# Patient Record
Sex: FEMALE | Race: WHITE | NOT HISPANIC OR LATINO | Employment: UNEMPLOYED | URBAN - METROPOLITAN AREA
[De-identification: names, ages, dates, MRNs, and addresses within clinical notes are randomized per-mention and may not be internally consistent; named-entity substitution may affect disease eponyms.]

---

## 2023-08-22 ENCOUNTER — TRANSCRIBE ORDERS (OUTPATIENT)
Dept: OBSTETRICS AND GYNECOLOGY | Facility: HOSPITAL | Age: 23
End: 2023-08-22
Payer: COMMERCIAL

## 2023-08-22 DIAGNOSIS — O28.3 ABNORMAL FETAL ULTRASOUND: ICD-10-CM

## 2023-08-22 DIAGNOSIS — O35.BXX0 ANOMALY OF HEART OF FETUS AFFECTING PREGNANCY, ANTEPARTUM, SINGLE OR UNSPECIFIED FETUS: ICD-10-CM

## 2023-08-22 DIAGNOSIS — Z36.3 ANTENATAL SCREENING FOR MALFORMATION USING ULTRASONICS: Primary | ICD-10-CM

## 2023-08-22 DIAGNOSIS — Z34.90 PREGNANCY, UNSPECIFIED GESTATIONAL AGE: ICD-10-CM

## 2023-09-13 ENCOUNTER — HOSPITAL ENCOUNTER (OUTPATIENT)
Dept: WOMENS IMAGING | Facility: HOSPITAL | Age: 23
Discharge: HOME | End: 2023-09-13
Admitting: OBSTETRICS & GYNECOLOGY
Payer: COMMERCIAL

## 2023-09-13 ENCOUNTER — OFFICE VISIT (OUTPATIENT)
Dept: OBSTETRICS AND GYNECOLOGY | Facility: HOSPITAL | Age: 23
End: 2023-09-13
Payer: COMMERCIAL

## 2023-09-13 VITALS
SYSTOLIC BLOOD PRESSURE: 109 MMHG | WEIGHT: 200 LBS | HEIGHT: 60 IN | BODY MASS INDEX: 39.27 KG/M2 | DIASTOLIC BLOOD PRESSURE: 69 MMHG

## 2023-09-13 DIAGNOSIS — Z36.3 ANTENATAL SCREENING FOR MALFORMATION USING ULTRASONICS: ICD-10-CM

## 2023-09-13 DIAGNOSIS — O35.BXX0 CONGENITAL HEART DISEASE OF FETUS AFFECTING ANTEPARTUM CARE OF MOTHER, SINGLE OR UNSPECIFIED FETUS: Primary | ICD-10-CM

## 2023-09-13 DIAGNOSIS — O28.3 ABNORMAL FETAL ULTRASOUND: ICD-10-CM

## 2023-09-13 DIAGNOSIS — O35.BXX0 ANOMALY OF HEART OF FETUS AFFECTING PREGNANCY, ANTEPARTUM, SINGLE OR UNSPECIFIED FETUS: ICD-10-CM

## 2023-09-13 DIAGNOSIS — Z34.90 PREGNANCY, UNSPECIFIED GESTATIONAL AGE: ICD-10-CM

## 2023-09-13 PROCEDURE — 76811 OB US DETAILED SNGL FETUS: CPT

## 2023-09-13 RX ORDER — PRENATAL VIT/IRON FUM/FOLIC AC 27MG-0.8MG
TABLET ORAL DAILY
COMMUNITY

## 2023-09-13 NOTE — PROGRESS NOTES
"Documentation of the ultrasound findings, images, and interpretations will be available in the patient's Viewpoint report which is located in the imaging tab in chart review.    Maternal/Fetal Medicine Consult Note     Name: Gwendolyn Rocha    : 2000     MRN: [id number]     Referring Provider: Kimberly Rothman DO    Chief Complaint  concern for dilated aortic root    Subjective     History of Present Illness:  Gwendolyn Rocha is a 23 y.o.  23w1d who presents today for suspected fetal CHD    ALMA ROSA: Estimated Date of Delivery: 24     ROS:   As noted in HPI.     History reviewed. No pertinent past medical history.   Past Surgical History:   Procedure Laterality Date    COLONOSCOPY      ESOPHAGOSCOPY / EGD      LAPAROSCOPIC CHOLECYSTECTOMY      WISDOM TOOTH EXTRACTION        OB History          2    Para   1    Term   1       0    AB   0    Living   1         SAB   0    IAB   0    Ectopic   0    Molar   0    Multiple   0    Live Births   1          Obstetric Comments   FOB #1 Pregnancy #1  at 37 6/7 weeks, female,  difficulty with epidural D/T scoliosis  FOB #1 Pregnancy #2  current               Objective     Vital Signs  /69   Ht 152.4 cm (60\")   Wt 90.7 kg (200 lb)   LMP 2023   Estimated body mass index is 39.06 kg/m² as calculated from the following:    Height as of this encounter: 152.4 cm (60\").    Weight as of this encounter: 90.7 kg (200 lb).    Physical Exam    Ultrasound Impression:   See viewpoint    Assessment and Plan     Gwendolyn Rocha is a 23 y.o.  23w1d who presents today for suspected fetal CHD    Diagnoses and all orders for this visit:    1. Congenital heart disease of fetus affecting antepartum care of mother, single or unspecified fetus (Primary)  Assessment & Plan:  Patient referred for suspected fetal congenital heart defect.  On ultrasound today we agree that the aortic root appears dilated in some views.  In the left " ventricular outflow view the aorta appears slightly dilated at the root.  And the three-vessel view of the aorta appears slightly enlarged however by the time he reached the level of the three-vessel trachea view the aorta appears normal size.  Cardiac weeks exam reveals normal-sized ventricles and no other abnormalities seen while I might suspect a small degree of aortic stenosis with a poststenotic dilatation no stenosis was visible on exam today and no acceleration of flow was demonstrated on color flow across the aortic valve.  This may represent a normal variant or may be a mild abnormality.  I have arranged for another echocardiogram with pediatric cardiology.  We will rescan the fetus for growth and to look for any other abnormalities when she returns for her echocardiogram with pediatric cardiology.    Orders:  -     [facility]; Future    2. Pregnancy, unspecified gestational age  -     [facility]; Future         Follow Up  Return in about 4 weeks (around 10/11/2023).    I spent 30 minutes caring for the patient on the day of service. This included: obtaining or reviewing a separately obtained medical history, reviewing patient records, performing a medically appropriate exam and/or evaluation, counseling or educating the patient/family/caregiver, ordering medications, labs, and/or procedures and documenting such in the medical record. This does not include time spent on review and interpretation of other tests such as fetal ultrasound or the performance of other procedures such as amniocentesis or CVS.      Douglas A. Milligan, MD  Maternal Fetal Medicine, [facility]    Diagnostic Center     2023

## 2023-09-13 NOTE — ASSESSMENT & PLAN NOTE
Patient referred for suspected fetal congenital heart defect.  On ultrasound today we agree that the aortic root appears dilated in some views.  In the left ventricular outflow view the aorta appears slightly dilated at the root.  And the three-vessel view of the aorta appears slightly enlarged however by the time he reached the level of the three-vessel trachea view the aorta appears normal size.  Cardiac weeks exam reveals normal-sized ventricles and no other abnormalities seen while I might suspect a small degree of aortic stenosis with a poststenotic dilatation no stenosis was visible on exam today and no acceleration of flow was demonstrated on color flow across the aortic valve.  This may represent a normal variant or may be a mild abnormality.  I have arranged for another echocardiogram with pediatric cardiology.  We will rescan the fetus for growth and to look for any other abnormalities when she returns for her echocardiogram with pediatric cardiology.

## 2023-09-13 NOTE — PROGRESS NOTES
Patient denies bleeding, leaking fluid or cramping  AFP negative  Patient does complain of syncopal episodes occurring as oftern as 3-4 times per day.  Patient states she becomes lightheaded and pale.  Patient states she has discussed with her OB and OB has run lab work.  Patient next follow up with Dr. JAYA Rothman is 9/15/2023

## 2023-09-13 NOTE — LETTER
"2023     Kimberly Rothman DO  [address]  [place] [address]    Patient: Gwendolyn Rocha   YOB: 2000   Date of Visit: 2023     Dear Kimberly Rothman DO:       Thank you for referring Gwendolyn Rocha to me for evaluation. Below are the relevant portions of my assessment and plan of care.    If you have questions, please do not hesitate to call me. I look forward to following Gwendolyn along with you.         Sincerely,        Douglas A. Milligan, MD        CC: No Recipients    Milligan, Douglas A, MD  23 1354  Incomplete  Documentation of the ultrasound findings, images, and interpretations will be available in the patient's Viewpoint report which is located in the imaging tab in chart review.    Maternal/Fetal Medicine Consult Note     Name: Gwendolyn Rocha    : 2000     MRN: [id number]     Referring Provider: Kimberly Rothman DO    Chief Complaint  concern for dilated aortic root    Subjective     History of Present Illness:  Gwendolyn Rocha is a 23 y.o.  23w1d who presents today for suspected fetal CHD    ALMA ROSA: Estimated Date of Delivery: 24     ROS:   As noted in HPI.     History reviewed. No pertinent past medical history.   Past Surgical History:   Procedure Laterality Date   • COLONOSCOPY     • ESOPHAGOSCOPY / EGD     • LAPAROSCOPIC CHOLECYSTECTOMY     • WISDOM TOOTH EXTRACTION        OB History          2    Para   1    Term   1       0    AB   0    Living   1         SAB   0    IAB   0    Ectopic   0    Molar   0    Multiple   0    Live Births   1          Obstetric Comments   FOB #1 Pregnancy #1  at 37 6/7 weeks, female,  difficulty with epidural D/T scoliosis  FOB #1 Pregnancy #2  current               Objective     Vital Signs  /69   Ht 152.4 cm (60\")   Wt 90.7 kg (200 lb)   LMP 2023   Estimated body mass index is 39.06 kg/m² as calculated from the following:    Height as of this encounter: " "152.4 cm (60\").    Weight as of this encounter: 90.7 kg (200 lb).    Physical Exam    Ultrasound Impression:   See viewpoint    Assessment and Plan     Gwendolyn Rocha is a 23 y.o.  23w1d who presents today for suspected fetal CHD    Diagnoses and all orders for this visit:    1. Congenital heart disease of fetus affecting antepartum care of mother, single or unspecified fetus (Primary)  Assessment & Plan:  Patient referred for suspected fetal congenital heart defect.  On ultrasound today we agree that the aortic root appears dilated in some views.  In the left ventricular outflow view the aorta appears slightly dilated at the root.  And the three-vessel view of the aorta appears slightly enlarged however by the time he reached the level of the three-vessel trachea view the aorta appears normal size.  Cardiac weeks exam reveals normal-sized ventricles and no other abnormalities seen while I might suspect a small degree of aortic stenosis with a poststenotic dilatation no stenosis was visible on exam today and no acceleration of flow was demonstrated on color flow across the aortic valve.  This may represent a normal variant or may be a mild abnormality.  I have arranged for another echocardiogram with pediatric cardiology.  We will rescan the fetus for growth and to look for any other abnormalities when she returns for her echocardiogram with pediatric cardiology.    Orders:  -     [facility]  Diagnostic Center; Future    2. Pregnancy, unspecified gestational age  -     [facility]  Diagnostic Center; Future         Follow Up  No follow-ups on file.    I spent ____ minutes caring for the patient on the day of service. This included: obtaining or reviewing a separately obtained medical history, reviewing patient records, performing a medically appropriate exam and/or evaluation, counseling or educating the patient/family/caregiver, ordering medications, labs, and/or procedures and documenting such in " the medical record. This does not include time spent on review and interpretation of other tests such as fetal ultrasound or the performance of other procedures such as amniocentesis or CVS.      Douglas A. Milligan, MD  Maternal Fetal Medicine, [facility] [place]     2023

## 2023-10-11 ENCOUNTER — HOSPITAL ENCOUNTER (OUTPATIENT)
Dept: WOMENS IMAGING | Facility: HOSPITAL | Age: 23
Discharge: HOME | End: 2023-10-11
Admitting: OBSTETRICS & GYNECOLOGY
Payer: COMMERCIAL

## 2023-10-11 ENCOUNTER — OFFICE VISIT (OUTPATIENT)
Dept: OBSTETRICS AND GYNECOLOGY | Facility: HOSPITAL | Age: 23
End: 2023-10-11
Payer: COMMERCIAL

## 2023-10-11 VITALS — SYSTOLIC BLOOD PRESSURE: 106 MMHG | DIASTOLIC BLOOD PRESSURE: 66 MMHG | BODY MASS INDEX: 39.33 KG/M2 | WEIGHT: 201.4 LBS

## 2023-10-11 DIAGNOSIS — O35.BXX0 CONGENITAL HEART DISEASE OF FETUS AFFECTING ANTEPARTUM CARE OF MOTHER, SINGLE OR UNSPECIFIED FETUS: Primary | ICD-10-CM

## 2023-10-11 DIAGNOSIS — O35.BXX0 CONGENITAL HEART DISEASE OF FETUS AFFECTING ANTEPARTUM CARE OF MOTHER, SINGLE OR UNSPECIFIED FETUS: ICD-10-CM

## 2023-10-11 DIAGNOSIS — Z34.90 PREGNANCY, UNSPECIFIED GESTATIONAL AGE: ICD-10-CM

## 2023-10-11 PROCEDURE — 76816 OB US FOLLOW-UP PER FETUS: CPT

## 2023-10-11 NOTE — PROGRESS NOTES
Pt reports recent increases and decreases in bp, has been tracking bp, reports recent trip into to ER for SOB had atelectisis, Next f/u with Stephan on 10/13/23

## 2023-10-11 NOTE — PROGRESS NOTES
"    Maternal/Fetal Medicine Consult Note   Date: 10/11/2023  Name: Gwendolyn Rocha    : 2000     MRN: [id number]     Referring Provider: Kimberly Rothman DO    Chief Complaint  Increased AO root     Subjective     History of Present Illness:  Gwendolyn Rocha is a 23 y.o.  27w1d who presents today for follow-up growth ultrasound and evaluation of fetal heart.    She states she overall feels well today.  Denies contractions, leaking of fluid, vaginal bleeding having normal fetal movement.    Patient just had pediatric cardiology fetal echo which showed    ALMA ROSA: Estimated Date of Delivery: 24     ROS:     Otherwise Noted in HPI      Current Outpatient Medications:     Prenatal Vit-Fe Fumarate-FA (prenatal vitamin 27-0.8) 27-0.8 MG tablet tablet, Take  by mouth Daily., Disp: , Rfl:     Objective     Vital Signs  /66   Wt 91.4 kg (201 lb 6.4 oz)   LMP 2023   Estimated body mass index is 39.33 kg/mý as calculated from the following:    Height as of 23: 152.4 cm (60\").    Weight as of this encounter: 91.4 kg (201 lb 6.4 oz).    Ultrasound Impression:   See Viewpoint     Assessment and Plan     Gwendolyn Rocha is a 23 y.o.  27w1d who presents today for  follow-up growth ultrasound and evaluation of fetal heart.    Diagnoses and all orders for this visit:    1. Congenital heart disease of fetus affecting antepartum care of mother, single or unspecified fetus (Primary)  Assessment & Plan:  Patient with  pediatric cardiology appointment today and per patient could be normal variant and recommended a  echo.  Would follow official recommendations available in report when available.  We will see patient here again in 5 weeks for growth around 32 weeks and would be happy to help coordinate delivery here if desired by patient, cardiology, primary OB/GYN.    Today's ultrasound shows an estimated fetal weight of 1066 g for the 54th percentile, abdominal circumference at the 19th " percentile.  With normal amniotic fluid.  Aorta appears mildly dilated today but slightly improved compared to previous ultrasound.  We will reevaluate this at 32 weeks.           Follow Up  Follow-up ultrasound at 32 weeks.    I spent 20 minutes caring for the patient on the day of service. This included: obtaining or reviewing a separately obtained medical history, reviewing patient records, performing a medically appropriate exam and/or evaluation, counseling or educating the patient/family/caregiver, ordering medications, labs, and/or procedures and documenting such in the medical record. This does not include time spent on review and interpretation of other tests such as fetal ultrasound or the performance of other procedures such as amniocentesis or CVS.      Joby Pantoja MD, FACOG  Maternal Fetal Medicine, [facility] Diagnostic [place]

## 2023-10-11 NOTE — LETTER
"2023       No Recipients    Patient: Gwendolyn Rocha   YOB: 2000   Date of Visit: 10/11/2023       Dear Kimberly Rothman DO,    Thank you for referring Gwendolyn Rocha to me for evaluation. Below is a copy of my consult note.    If you have questions, please do not hesitate to call me. I look forward to following Gwendolyn along with you.         Sincerely,        Joby Pantoja MD        CC:   No Recipients        Maternal/Fetal Medicine Consult Note   Date: 10/11/2023  Name: Gwendolyn Rocha    : 2000     MRN: [id number]     Referring Provider: Kimberly Rothman DO    Chief Complaint  Increased AO root     Subjective     History of Present Illness:  Gwendolyn Rocha is a 23 y.o.  27w1d who presents today for follow-up growth ultrasound and evaluation of fetal heart.    She states she overall feels well today.  Denies contractions, leaking of fluid, vaginal bleeding having normal fetal movement.    Patient just had pediatric cardiology fetal echo which showed    ALMA ROSA: Estimated Date of Delivery: 24     ROS:     Otherwise Noted in HPI      Current Outpatient Medications:     Prenatal Vit-Fe Fumarate-FA (prenatal vitamin 27-0.8) 27-0.8 MG tablet tablet, Take  by mouth Daily., Disp: , Rfl:     Objective     Vital Signs  /66   Wt 91.4 kg (201 lb 6.4 oz)   LMP 2023   Estimated body mass index is 39.33 kg/mý as calculated from the following:    Height as of 23: 152.4 cm (60\").    Weight as of this encounter: 91.4 kg (201 lb 6.4 oz).    Ultrasound Impression:   See Viewpoint     Assessment and Plan     Gwendolyn Rocha is a 23 y.o.  27w1d who presents today for  follow-up growth ultrasound and evaluation of fetal heart.    Diagnoses and all orders for this visit:    1. Congenital heart disease of fetus affecting antepartum care of mother, single or unspecified fetus (Primary)  Assessment & Plan:  Patient with  pediatric cardiology appointment today and " per patient could be normal variant and recommended a  echo.  Would follow official recommendations available in report when available.  We will see patient here again in 5 weeks for growth around 32 weeks and would be happy to help coordinate delivery here if desired by patient, cardiology, primary OB/GYN.    Today's ultrasound shows an estimated fetal weight of 1066 g for the 54th percentile, abdominal circumference at the 19th percentile.  With normal amniotic fluid.  Aorta appears mildly dilated today but slightly improved compared to previous ultrasound.  We will reevaluate this at 32 weeks.           Follow Up  Follow-up ultrasound at 32 weeks.    I spent 20 minutes caring for the patient on the day of service. This included: obtaining or reviewing a separately obtained medical history, reviewing patient records, performing a medically appropriate exam and/or evaluation, counseling or educating the patient/family/caregiver, ordering medications, labs, and/or procedures and documenting such in the medical record. This does not include time spent on review and interpretation of other tests such as fetal ultrasound or the performance of other procedures such as amniocentesis or CVS.      Joby Pantoja MD, FACOG  Maternal Fetal Medicine, [facility] [place]     Pt reports recent increases and decreases in bp, has been tracking bp, reports recent trip into to ER for SOB had atelectisis, Next f/u with Stephan on 10/13/23

## 2023-11-15 ENCOUNTER — HOSPITAL ENCOUNTER (OUTPATIENT)
Dept: WOMENS IMAGING | Facility: HOSPITAL | Age: 23
Discharge: HOME | End: 2023-11-15
Admitting: OBSTETRICS & GYNECOLOGY
Payer: COMMERCIAL

## 2023-11-15 ENCOUNTER — OFFICE VISIT (OUTPATIENT)
Dept: OBSTETRICS AND GYNECOLOGY | Facility: HOSPITAL | Age: 23
End: 2023-11-15
Payer: COMMERCIAL

## 2023-11-15 VITALS — SYSTOLIC BLOOD PRESSURE: 115 MMHG | WEIGHT: 205.6 LBS | DIASTOLIC BLOOD PRESSURE: 71 MMHG | BODY MASS INDEX: 40.15 KG/M2

## 2023-11-15 DIAGNOSIS — O35.BXX0 CONGENITAL HEART DISEASE OF FETUS AFFECTING ANTEPARTUM CARE OF MOTHER, SINGLE OR UNSPECIFIED FETUS: ICD-10-CM

## 2023-11-15 DIAGNOSIS — Z34.90 PREGNANCY, UNSPECIFIED GESTATIONAL AGE: ICD-10-CM

## 2023-11-15 DIAGNOSIS — O35.BXX0 CONGENITAL HEART DISEASE OF FETUS AFFECTING ANTEPARTUM CARE OF MOTHER, SINGLE OR UNSPECIFIED FETUS: Primary | ICD-10-CM

## 2023-11-15 PROCEDURE — 76819 FETAL BIOPHYS PROFIL W/O NST: CPT

## 2023-11-15 PROCEDURE — 76816 OB US FOLLOW-UP PER FETUS: CPT

## 2023-11-15 NOTE — LETTER
"November 15, 2023     Kimberly Rothman DO  [address]   [place] [address]    Patient: Gwendolyn Rocha   YOB: 2000   Date of Visit: 11/15/2023     Dear Kimberly Rothman DO:       Thank you for referring Gwendolyn Rocha to me for evaluation. Below are the relevant portions of my assessment and plan of care.    If you have questions, please do not hesitate to call me. I look forward to following Gwendolyn along with you.         Sincerely,        Douglas A. Milligan, MD        CC: No Recipients    Milligan, Douglas A, MD  11/15/23 1126  Sign when Signing Visit  Documentation of the ultrasound findings, images, and interpretations will be available in the patient's Viewpoint report which is located in the imaging tab in chart review.    Maternal/Fetal Medicine Follow Up  Note     Name: Gwendolyn Rocha    : 2000     MRN: [id number]     Referring Provider: Kimberly Rothman DO    Chief Complaint  dilated aortic root     Subjective     History of Present Illness:  Gwendolyn Rocha is a 23 y.o.  32w1d who presents today for fetal cardiac anomaly    ALMA ROSA: Estimated Date of Delivery: 24     ROS:   As noted in HPI.     Objective     Vital Signs  /71   Wt 93.3 kg (205 lb 9.6 oz)   LMP 2023   Estimated body mass index is 40.15 kg/m² as calculated from the following:    Height as of 23: 152.4 cm (60\").    Weight as of this encounter: 93.3 kg (205 lb 9.6 oz).    Physical Exam    Ultrasound Impression:   See Viewpoint    Assessment and Plan     Gwendolyn Rocha is a 23 y.o.  32w1d who presents today for fetal cardiac anomaly    Diagnoses and all orders for this visit:    1. Congenital heart disease of fetus affecting antepartum care of mother, single or unspecified fetus (Primary)  Assessment & Plan:  Patient returns today for follow-up after having a previous ultrasound demonstrating a mildly dilated aortic root.  Patient has had a fetal echocardiogram performed by " pediatric cardiology at [facility].  Noted a minimally dilated aortic root but no other abnormalities in the heart.  They felt that this may be an normal variant or may be related to possibly some collagen vascular disease.  They did note that they do not think the infant would have significant difficulties at birth and that they are perfectly okay for the patient to deliver in [place].  They do recommend a repeat echocardiogram after birth and would be happy to see the infant and consult as an outpatient after birth if any abnormalities are seen.    We discussed this with the patient and she was initially inquiring about delivery here at [facility].  We informed her that if her and her physician desired so we could make arrangements for delivery here but this would most likely require her to have her visits with the obstetrician here rather than in [place].  This would allow the patient to get to know the delivering physician and delivering physician to get to know the patient which is better for patient care.  After discussing the pediatric cardiology recommendations and pointing out to the patient the and we did not feel that there was a significant risk of significant complications right at birth the patient will discuss delivering in [place] with her doctor.    Ultrasound today demonstrated a normally grown fetus with an aortic root which appears may be minimally dilated certainly no progression from previous scans.  Findings to be reassuring and informed the patient we will scan again in 4 weeks and if there is any progression we would make arrangements for delivery appears and indicated delivery.    Patient was counseled extensively regarding fetal movement and will contact her provider immediately if she notices any decreased fetal movement.      2. Pregnancy, unspecified gestational age         Follow Up  Return in about 4 weeks (around 12/13/2023).    I spent 20 minutes caring  for the patient on the day of service. This included: obtaining or reviewing a separately obtained medical history, reviewing patient records, performing a medically appropriate exam and/or evaluation, counseling or educating the patient/family/caregiver, ordering medications, labs, and/or procedures and documenting such in the medical record. This does not include time spent on review and interpretation of other tests such as fetal ultrasound or the performance of other procedures such as amniocentesis or CVS.      Douglas A. Milligan, MD  Maternal Fetal Medicine, [facility]     11/15/2023

## 2023-11-15 NOTE — PROGRESS NOTES
"Documentation of the ultrasound findings, images, and interpretations will be available in the patient's Viewpoint report which is located in the imaging tab in chart review.    Maternal/Fetal Medicine Follow Up  Note     Name: Gwendolyn Rocha    : 2000     MRN: [id number]     Referring Provider: Kimberly Rothman DO    Chief Complaint  dilated aortic root     Subjective     History of Present Illness:  Gwendolyn Rocha is a 23 y.o.  32w1d who presents today for fetal cardiac anomaly    ALMA ROSA: Estimated Date of Delivery: 24     ROS:   As noted in HPI.     Objective     Vital Signs  /71   Wt 93.3 kg (205 lb 9.6 oz)   LMP 2023   Estimated body mass index is 40.15 kg/m² as calculated from the following:    Height as of 23: 152.4 cm (60\").    Weight as of this encounter: 93.3 kg (205 lb 9.6 oz).    Physical Exam    Ultrasound Impression:   See Viewpoint    Assessment and Plan     Gwendolyn Rocha is a 23 y.o.  32w1d who presents today for fetal cardiac anomaly    Diagnoses and all orders for this visit:    1. Congenital heart disease of fetus affecting antepartum care of mother, single or unspecified fetus (Primary)  Assessment & Plan:  Patient returns today for follow-up after having a previous ultrasound demonstrating a mildly dilated aortic root.  Patient has had a fetal echocardiogram performed by pediatric cardiology at [facility].  Noted a minimally dilated aortic root but no other abnormalities in the heart.  They felt that this may be an normal variant or may be related to possibly some collagen vascular disease.  They did note that they do not think the infant would have significant difficulties at birth and that they are perfectly okay for the patient to deliver in [place].  They do recommend a repeat echocardiogram after birth and would be happy to see the infant and consult as an outpatient after birth if any abnormalities are seen.    We discussed this with " the patient and she was initially inquiring about delivery here at [facility].  We informed her that if her and her physician desired so we could make arrangements for delivery here but this would most likely require her to have her visits with the obstetrician here rather than in [place].  This would allow the patient to get to know the delivering physician and delivering physician to get to know the patient which is better for patient care.  After discussing the pediatric cardiology recommendations and pointing out to the patient the and we did not feel that there was a significant risk of significant complications right at birth the patient will discuss delivering in [place] with her doctor.    Ultrasound today demonstrated a normally grown fetus with an aortic root which appears may be minimally dilated certainly no progression from previous scans.  Findings to be reassuring and informed the patient we will scan again in 4 weeks and if there is any progression we would make arrangements for delivery appears and indicated delivery.    Patient was counseled extensively regarding fetal movement and will contact her provider immediately if she notices any decreased fetal movement.      2. Pregnancy, unspecified gestational age         Follow Up  Return in about 4 weeks (around 12/13/2023).    I spent 20 minutes caring for the patient on the day of service. This included: obtaining or reviewing a separately obtained medical history, reviewing patient records, performing a medically appropriate exam and/or evaluation, counseling or educating the patient/family/caregiver, ordering medications, labs, and/or procedures and documenting such in the medical record. This does not include time spent on review and interpretation of other tests such as fetal ultrasound or the performance of other procedures such as amniocentesis or CVS.      Douglas A. Milligan, MD  Maternal Fetal Medicine, [facility]  [place]    [facility]     11/15/2023

## 2023-11-15 NOTE — ASSESSMENT & PLAN NOTE
Patient returns today for follow-up after having a previous ultrasound demonstrating a mildly dilated aortic root.  Patient has had a fetal echocardiogram performed by pediatric cardiology at [facility].  Noted a minimally dilated aortic root but no other abnormalities in the heart.  They felt that this may be an normal variant or may be related to possibly some collagen vascular disease.  They did note that they do not think the infant would have significant difficulties at birth and that they are perfectly okay for the patient to deliver in [place].  They do recommend a repeat echocardiogram after birth and would be happy to see the infant and consult as an outpatient after birth if any abnormalities are seen.    We discussed this with the patient and she was initially inquiring about delivery here at [facility].  We informed her that if her and her physician desired so we could make arrangements for delivery here but this would most likely require her to have her visits with the obstetrician here rather than in [place].  This would allow the patient to get to know the delivering physician and delivering physician to get to know the patient which is better for patient care.  After discussing the pediatric cardiology recommendations and pointing out to the patient the and we did not feel that there was a significant risk of significant complications right at birth the patient will discuss delivering in [place] with her doctor.    Ultrasound today demonstrated a normally grown fetus with an aortic root which appears may be minimally dilated certainly no progression from previous scans.  Findings to be reassuring and informed the patient we will scan again in 4 weeks and if there is any progression we would make arrangements for delivery appears and indicated delivery.    Patient was counseled extensively regarding fetal movement and will contact her provider immediately if she notices  any decreased fetal movement.

## 2023-11-15 NOTE — PROGRESS NOTES
"Pt reports Kimberly Rothman her OB wants to inquire if 1)fetal testing can be done at their office instead of here,and 2) If delivery of baby can be performed at [facility] due to \"fetal heart problems\" and safety for baby.  Next f/u with Stephan next week. AFP - neg, Carrier screen negative   "

## 2023-12-14 ENCOUNTER — OFFICE VISIT (OUTPATIENT)
Dept: OBSTETRICS AND GYNECOLOGY | Facility: HOSPITAL | Age: 23
End: 2023-12-14
Payer: COMMERCIAL

## 2023-12-14 ENCOUNTER — HOSPITAL ENCOUNTER (OUTPATIENT)
Dept: WOMENS IMAGING | Facility: HOSPITAL | Age: 23
Discharge: HOME | End: 2023-12-14
Admitting: OBSTETRICS & GYNECOLOGY
Payer: COMMERCIAL

## 2023-12-14 VITALS — SYSTOLIC BLOOD PRESSURE: 113 MMHG | WEIGHT: 213 LBS | BODY MASS INDEX: 41.6 KG/M2 | DIASTOLIC BLOOD PRESSURE: 69 MMHG

## 2023-12-14 DIAGNOSIS — Z34.90 PREGNANCY, UNSPECIFIED GESTATIONAL AGE: ICD-10-CM

## 2023-12-14 DIAGNOSIS — O35.BXX0 CONGENITAL HEART DISEASE OF FETUS AFFECTING ANTEPARTUM CARE OF MOTHER, SINGLE OR UNSPECIFIED FETUS: ICD-10-CM

## 2023-12-14 DIAGNOSIS — O35.BXX0 CONGENITAL HEART DISEASE OF FETUS AFFECTING ANTEPARTUM CARE OF MOTHER, SINGLE OR UNSPECIFIED FETUS: Primary | ICD-10-CM

## 2023-12-14 PROCEDURE — 76816 OB US FOLLOW-UP PER FETUS: CPT

## 2023-12-14 PROCEDURE — 76819 FETAL BIOPHYS PROFIL W/O NST: CPT

## 2023-12-14 NOTE — ASSESSMENT & PLAN NOTE
Patient returns for follow-up of a previous ultrasound that was suspicious for a mildly dilated aortic root.  Pediatric cardiology evaluated the patient in October and felt that the structure of the fetal heart was entirely normal the aorta was upper limits of normal in size.  They did not feel there was any reason the patient could not deliver in [place] and expected the baby to do well in the nursery.    Ultrasound today demonstrates a normally grown fetus with normal amniotic fluid and umbilical artery Dopplers.  Fetal heart appears entirely normal.  The aortic root appears entirely normal and not dilated today.  Fetus is active there is no evidence of congenital heart disease.    I believe patient is fine for delivery in [place] and would expect the infant to do well.  I do not feel that there is any indication for early delivery.  Owing to the earlier suspicions I would recommend an echocardiogram of the , ideally done at least 24 hours after delivery, but could be done as an outpatient after discharge as well.

## 2023-12-14 NOTE — LETTER
"2023     Kimberly Rothman DO  [address]   [place] [address]    Patient: Gwendolyn Rocha   YOB: 2000   Date of Visit: 2023     Dear Kimberly Rothman DO:       Thank you for referring Gwendolyn Rocha to me for evaluation. Below are the relevant portions of my assessment and plan of care.    If you have questions, please do not hesitate to call me. I look forward to following Gwendolyn along with you.         Sincerely,        Douglas A. Milligan, MD        CC: No Recipients    Milligan, Douglas A, MD  23 1222  Sign when Signing Visit  Documentation of the ultrasound findings, images, and interpretations will be available in the patient's Viewpoint report which is located in the imaging tab in chart review.    Maternal/Fetal Medicine Follow Up  Note     Name: Gwendolyn Rocha    : 2000     MRN: [id number]     Referring Provider: Kimberly Rothman DO    Chief Complaint  dilated AO root    Subjective     History of Present Illness:  Gwendolyn Rocha is a 23 y.o.  36w2d who presents today for possible fetal CHD    ALMA ROSA: Estimated Date of Delivery: 24     ROS:   As noted in HPI.     Objective     Vital Signs  /69   Wt 96.6 kg (213 lb)   LMP 2023   Estimated body mass index is 41.6 kg/m² as calculated from the following:    Height as of 23: 152.4 cm (60\").    Weight as of this encounter: 96.6 kg (213 lb).    Physical Exam    Ultrasound Impression:   See Viewpoint    Assessment and Plan     Gwendolyn Rocha is a 23 y.o.  36w2d who presents today for possible fetal CHD    Diagnoses and all orders for this visit:    1. Congenital heart disease of fetus affecting antepartum care of mother, single or unspecified fetus (Primary)  Assessment & Plan:  Patient returns for follow-up of a previous ultrasound that was suspicious for a mildly dilated aortic root.  Pediatric cardiology evaluated the patient in October and felt that the structure of the " fetal heart was entirely normal the aorta was upper limits of normal in size.  They did not feel there was any reason the patient could not deliver in [place] and expected the baby to do well in the nursery.    Ultrasound today demonstrates a normally grown fetus with normal amniotic fluid and umbilical artery Dopplers.  Fetal heart appears entirely normal.  The aortic root appears entirely normal and not dilated today.  Fetus is active there is no evidence of congenital heart disease.    I believe patient is fine for delivery in [place] and would expect the infant to do well.  I do not feel that there is any indication for early delivery.  Owing to the earlier suspicions I would recommend an echocardiogram of the , ideally done at least 24 hours after delivery, but could be done as an outpatient after discharge as well.      2. Pregnancy, unspecified gestational age         Follow Up  No follow-ups on file.    I spent 20 minutes caring for the patient on the day of service. This included: obtaining or reviewing a separately obtained medical history, reviewing patient records, performing a medically appropriate exam and/or evaluation, counseling or educating the patient/family/caregiver, ordering medications, labs, and/or procedures and documenting such in the medical record. This does not include time spent on review and interpretation of other tests such as fetal ultrasound or the performance of other procedures such as amniocentesis or CVS.      Douglas A. Milligan, MD  Maternal Fetal Medicine, [facility]     2023

## 2023-12-14 NOTE — PROGRESS NOTES
"Documentation of the ultrasound findings, images, and interpretations will be available in the patient's Viewpoint report which is located in the imaging tab in chart review.    Maternal/Fetal Medicine Follow Up  Note     Name: Gwendolyn Rocha    : 2000     MRN: [id number]     Referring Provider: Kimberly Rothman DO    Chief Complaint  dilated AO root    Subjective     History of Present Illness:  Gwendolyn Rocha is a 23 y.o.  36w2d who presents today for possible fetal CHD    ALMA ROSA: Estimated Date of Delivery: 24     ROS:   As noted in HPI.     Objective     Vital Signs  /69   Wt 96.6 kg (213 lb)   LMP 2023   Estimated body mass index is 41.6 kg/m² as calculated from the following:    Height as of 23: 152.4 cm (60\").    Weight as of this encounter: 96.6 kg (213 lb).    Physical Exam    Ultrasound Impression:   See Viewpoint    Assessment and Plan     Gwendolyn Rocha is a 23 y.o.  36w2d who presents today for possible fetal CHD    Diagnoses and all orders for this visit:    1. Congenital heart disease of fetus affecting antepartum care of mother, single or unspecified fetus (Primary)  Assessment & Plan:  Patient returns for follow-up of a previous ultrasound that was suspicious for a mildly dilated aortic root.  Pediatric cardiology evaluated the patient in October and felt that the structure of the fetal heart was entirely normal the aorta was upper limits of normal in size.  They did not feel there was any reason the patient could not deliver in [place] and expected the baby to do well in the nursery.    Ultrasound today demonstrates a normally grown fetus with normal amniotic fluid and umbilical artery Dopplers.  Fetal heart appears entirely normal.  The aortic root appears entirely normal and not dilated today.  Fetus is active there is no evidence of congenital heart disease.    I believe patient is fine for delivery in [place] and would expect the infant to do " well.  I do not feel that there is any indication for early delivery.  Owing to the earlier suspicions I would recommend an echocardiogram of the , ideally done at least 24 hours after delivery, but could be done as an outpatient after discharge as well.      2. Pregnancy, unspecified gestational age         Follow Up  No follow-ups on file.    I spent 20 minutes caring for the patient on the day of service. This included: obtaining or reviewing a separately obtained medical history, reviewing patient records, performing a medically appropriate exam and/or evaluation, counseling or educating the patient/family/caregiver, ordering medications, labs, and/or procedures and documenting such in the medical record. This does not include time spent on review and interpretation of other tests such as fetal ultrasound or the performance of other procedures such as amniocentesis or CVS.      Douglas A. Milligan, MD  Maternal Fetal Medicine, [facility]    Diagn[facility] Center     2023

## 2023-12-14 NOTE — PROGRESS NOTES
Patient denies bleeding, or leaking fluid but is having yamileth schulte contractions  NIPT not done  AFP negative  Patients next follow up with Dr. JAYA Rothman is 12/20/2023